# Patient Record
Sex: MALE | Race: WHITE | NOT HISPANIC OR LATINO | Employment: OTHER | URBAN - METROPOLITAN AREA
[De-identification: names, ages, dates, MRNs, and addresses within clinical notes are randomized per-mention and may not be internally consistent; named-entity substitution may affect disease eponyms.]

---

## 2020-07-20 ENCOUNTER — OFFICE VISIT - HEALTHEAST (OUTPATIENT)
Dept: FAMILY MEDICINE | Facility: CLINIC | Age: 70
End: 2020-07-20

## 2020-07-20 DIAGNOSIS — Z71.1 CONCERN ABOUT SKIN CANCER WITHOUT DIAGNOSIS: ICD-10-CM

## 2020-07-20 DIAGNOSIS — F17.200 TOBACCO USE DISORDER: ICD-10-CM

## 2020-07-20 DIAGNOSIS — I10 ESSENTIAL HYPERTENSION: ICD-10-CM

## 2020-07-20 DIAGNOSIS — E88.810 METABOLIC SYNDROME X: ICD-10-CM

## 2020-07-20 DIAGNOSIS — Z00.00 ROUTINE GENERAL MEDICAL EXAMINATION AT A HEALTH CARE FACILITY: ICD-10-CM

## 2020-07-20 DIAGNOSIS — E78.5 DYSLIPIDEMIA: ICD-10-CM

## 2020-07-20 DIAGNOSIS — Z11.59 ENCOUNTER FOR HEPATITIS C SCREENING TEST FOR LOW RISK PATIENT: ICD-10-CM

## 2020-07-20 DIAGNOSIS — Z11.4 SCREENING FOR HIV WITHOUT PRESENCE OF RISK FACTORS: ICD-10-CM

## 2020-07-20 DIAGNOSIS — Z12.5 SCREENING FOR PROSTATE CANCER: ICD-10-CM

## 2020-07-20 DIAGNOSIS — Z12.11 SPECIAL SCREENING FOR MALIGNANT NEOPLASMS, COLON: ICD-10-CM

## 2020-07-20 LAB
ALT SERPL W P-5'-P-CCNC: 13 U/L (ref 0–45)
ANION GAP SERPL CALCULATED.3IONS-SCNC: 11 MMOL/L (ref 5–18)
BUN SERPL-MCNC: 12 MG/DL (ref 8–22)
CALCIUM SERPL-MCNC: 9.7 MG/DL (ref 8.5–10.5)
CHLORIDE BLD-SCNC: 104 MMOL/L (ref 98–107)
CHOLEST SERPL-MCNC: 192 MG/DL
CO2 SERPL-SCNC: 25 MMOL/L (ref 22–31)
CREAT SERPL-MCNC: 0.84 MG/DL (ref 0.7–1.3)
FASTING STATUS PATIENT QL REPORTED: YES
GFR SERPL CREATININE-BSD FRML MDRD: >60 ML/MIN/1.73M2
GLUCOSE BLD-MCNC: 94 MG/DL (ref 70–125)
HBA1C MFR BLD: 5.4 %
HCV AB SERPL QL IA: NEGATIVE
HDLC SERPL-MCNC: 45 MG/DL
HIV 1+2 AB+HIV1 P24 AG SERPL QL IA: NEGATIVE
LDLC SERPL CALC-MCNC: 119 MG/DL
POTASSIUM BLD-SCNC: 4.4 MMOL/L (ref 3.5–5)
PSA SERPL-MCNC: 1.7 NG/ML (ref 0–4.5)
SODIUM SERPL-SCNC: 140 MMOL/L (ref 136–145)
TRIGL SERPL-MCNC: 141 MG/DL

## 2020-07-20 ASSESSMENT — MIFFLIN-ST. JEOR: SCORE: 1266.82

## 2020-07-20 NOTE — ASSESSMENT & PLAN NOTE
The patient previously tolerated and had a robust response to 40 mg of atorvastatin.  We discussed his ten-year prevascular risk whichbased on smoking, blood pressure, age and gender will likely exceed 20%.  I will recommend resuming atorvastatin at either 20 mg or 40 mg after his fasting lab work is reviewed.

## 2020-07-20 NOTE — ASSESSMENT & PLAN NOTE
Borderline hypertensive today.  Previously his blood pressure was elevated.  The patient described an episode of hypotension which resulted in an emergency department visit.  For that reason, he be reticent to start an antihypertensive medication.

## 2020-07-20 NOTE — ASSESSMENT & PLAN NOTE
Based on review of records from his previous health system, this patient meets 4/5 criteria for metabolic syndrome.  This is likely dietary with some contribution of his smoking.  Weight has always been low and is stable.  We will update his fasting lab work today.  We discussed the dietary changes that would be required if he does have metabolic syndrome/prediabetes.

## 2020-07-20 NOTE — ASSESSMENT & PLAN NOTE
This patient is new to clinic.  Annual exam.  He continues to work in a [employer] firm.  His job does involve travel.  Muchof his food is convenience (fast food or restaurant food).  He describes himself as a minimalist with respect to healthcare and is in clinic because his wife and daughter mandated today's visit.  -Preventative health: Colonoscopy order placed.  There is likely a family history colon cancer in his mother.  We discussed shingles vaccination.  Will check for hepatitis C and HIV.  - Patient requests dermatology referral.  He has sun damage but no obvious lesions that are of concern today.  - No obstructive symptoms of the lower urinary tract.  The patient does request PSA testing.  This was ordered.  - Metabolic syndrome as discussed elsewhere

## 2020-07-21 ENCOUNTER — COMMUNICATION - HEALTHEAST (OUTPATIENT)
Dept: FAMILY MEDICINE | Facility: CLINIC | Age: 70
End: 2020-07-21

## 2020-12-12 ENCOUNTER — RECORDS - HEALTHEAST (OUTPATIENT)
Dept: ADMINISTRATIVE | Facility: OTHER | Age: 70
End: 2020-12-12

## 2021-06-04 VITALS
OXYGEN SATURATION: 97 % | DIASTOLIC BLOOD PRESSURE: 88 MMHG | WEIGHT: 120.4 LBS | HEART RATE: 74 BPM | TEMPERATURE: 96.6 F | SYSTOLIC BLOOD PRESSURE: 140 MMHG | HEIGHT: 67 IN | RESPIRATION RATE: 16 BRPM | BODY MASS INDEX: 18.9 KG/M2

## 2021-06-09 NOTE — PROGRESS NOTES
Assessment and Plan:     Problem List Items Addressed This Visit     Dyslipidemia     The patient previously tolerated and had a robust response to 40 mg of atorvastatin.  We discussed his ten-year prevascular risk which based on smoking, blood pressure, age and gender will likely exceed 20%.  I will recommend resuming atorvastatin at either 20 mg or 40 mg after his fasting lab work is reviewed.         Relevant Orders    Lipid [place] FASTING    HTN (hypertension)     Borderline hypertensive today.  Previously his blood pressure was elevated.  The patient described an episode of hypotension which resulted in an emergency department visit.  For that reason, he be reticent to start an antihypertensive medication.         Tobacco use disorder     60+ pack year history.  Discussed in detail lung cancer screening.  Declined.             Routine general medical examination at a health care facility - Primary     This patient is new to clinic.  Annual exam.  He continues to work in a [employer] firm.  His job does involve travel.  Much of his food is convenience (fast food or restaurant food).  He describes himself as a minimalist with respect to healthcare and is in clinic because his wife and daughter mandated today's visit.  -Preventative health: Colonoscopy order placed.  There is likely a family history colon cancer in his mother.  We discussed shingles vaccination.  Will check for hepatitis C and HIV.  - Patient requests dermatology referral.  He has sun damage but no obvious lesions that are of concern today.  - No obstructive symptoms of the lower urinary tract.  The patient does request PSA testing.  This was ordered.  - Metabolic syndrome as discussed elsewhere         Relevant Orders    Lipid [place] FASTING    Basic Metabolic Panel    ALT (SGPT)    Glycosylated Hemoglobin A1c    Hepatitis C Antibody (Anti-HCV)    HIV Antigen/Antibody Screening Cascade    Metabolic syndrome X     Based on review of  records from his previous health system, this patient meets 4/5 criteria for metabolic syndrome.  This is likely dietary with some contribution of his smoking.  Weight has always been low and is stable.  We will update his fasting lab work today.  We discussed the dietary changes that would be required if he does have metabolic syndrome/prediabetes.         Relevant Orders    Lipid [place] FASTING    Basic Metabolic Panel    Glycosylated Hemoglobin A1c      Other Visit Diagnoses     Concern about skin cancer without diagnosis        Relevant Orders    Ambulatory referral to Dermatology    Screening for prostate cancer        Relevant Orders    PSA (Prostatic-Specific Antigen), Annual Screen    Screening for HIV without presence of risk factors        Relevant Orders    HIV Antigen/Antibody Screening Cascade    Encounter for hepatitis C screening test for low risk patient        Relevant Orders    Hepatitis C Antibody (Anti-HCV)    Special screening for malignant neoplasms, colon        Relevant Orders    Ambulatory referral for Colonoscopy        The patient's current medical problems were reviewed.    I have had an Advance Directives discussion with the patient.  The following health maintenance schedule was reviewed with the patient and provided in printed form in the after visit summary:   Health Maintenance   Topic Date Due     HEPATITIS C SCREENING  1950     COLORECTAL CANCER SCREENING  09/16/1970     LIPID  09/16/1987     PNEUMOCOCCAL IMMUNIZATION 65+ LOW/MEDIUM RISK (1 of 2 - PCV13) 08/03/2020 (Originally 9/16/2017)     ZOSTER VACCINES (1 of 2) 08/03/2020 (Originally 9/16/2002)     INFLUENZA VACCINE RULE BASED (1) 08/01/2020     MEDICARE ANNUAL WELLNESS VISIT  07/20/2021     FALL RISK ASSESSMENT  07/20/2021     ADVANCE CARE PLANNING  07/20/2025     TD 18+ HE  07/16/2026     HEPATITIS B VACCINES  Aged Out        Subjective:   Chief Complaint: Jaime Giron is an 69 y.o. male here for an Annual Wellness  "visit.   HPI:      \"I feel fine.\"  He has not been on antihypertensive medications for 3+ years.      Smoking.  Previously 2-3 packs per day.  Now 1-2 packs per day.  Failed chantix in 2013.     Review of Systems:   Please see above.  The rest of the review of systems are negative for all systems.    Patient Care Team:  Cornelius Grimaldo MD as PCP - General (Family Medicine)     Patient Active Problem List   Diagnosis     Dyslipidemia     ED (erectile dysfunction) of organic origin     HTN (hypertension)     Tobacco use disorder     Routine general medical examination at a health care facility     Metabolic syndrome X     Past Medical History:   Diagnosis Date     Hypertension      Prediabetes       Past Surgical History:   Procedure Laterality Date     OTHER SURGICAL HISTORY      broken cheek bone repair       Family History   Adopted: Yes   Problem Relation Age of Onset     Cancer Mother         colon ?     Cancer Half Brother      No Medical Problems Daughter      No Medical Problems Daughter       Social History     Socioeconomic History     Marital status:      Spouse name: Not on file     Number of children: Not on file     Years of education: Not on file     Highest education level: Not on file   Occupational History     Not on file   Social Needs     Financial resource strain: Not on file     Food insecurity     Worry: Not on file     Inability: Not on file     Transportation needs     Medical: Not on file     Non-medical: Not on file   Tobacco Use     Smoking status: Current Every Day Smoker     Packs/day: 1.00     Years: 45.00     Pack years: 45.00     Smokeless tobacco: Never Used     Tobacco comment: 1 pack per day    Substance and Sexual Activity     Alcohol use: Yes     Comment: 4-5 beers per week      Drug use: Not Currently     Sexual activity: Yes     Partners: Female   Lifestyle     Physical activity     Days per week: Not on file     Minutes per session: Not on file     Stress: Not on file " "  Relationships     Social connections     Talks on phone: Not on file     Gets together: Not on file     Attends Muslim service: Not on file     Active member of club or organization: Not on file     Attends meetings of clubs or organizations: Not on file     Relationship status: Not on file     Intimate partner violence     Fear of current or ex partner: Not on file     Emotionally abused: Not on file     Physically abused: Not on file     Forced sexual activity: Not on file   Other Topics Concern     Not on file   Social History Narrative     Not on file      No current outpatient medications on file.     No current facility-administered medications for this visit.       Objective:   Vital Signs:   Visit Vitals  /88 (Patient Site: Left Arm, Patient Position: Sitting, Cuff Size: Adult Regular)   Pulse 74   Temp 96.6  F (35.9  C) (Oral)   Resp 16   Ht 5' 6.5\" (1.689 m)   Wt 120 lb 6.4 oz (54.6 kg)   SpO2 97% Comment: room air   BMI 19.14 kg/m       VisionScreening:  No exam data present     PHYSICAL EXAM  Physical Exam  Vitals signs reviewed.   Constitutional:       General: He is not in acute distress.     Appearance: He is well-developed.   HENT:      Head: Normocephalic and atraumatic.      Right Ear: External ear normal.      Left Ear: External ear normal.      Nose: Nose normal.      Mouth/Throat:      Pharynx: No oropharyngeal exudate.   Eyes:      General: No scleral icterus.        Right eye: No discharge.         Left eye: No discharge.      Conjunctiva/sclera: Conjunctivae normal.      Pupils: Pupils are equal, round, and reactive to light.   Neck:      Musculoskeletal: Normal range of motion.      Thyroid: No thyromegaly.   Cardiovascular:      Rate and Rhythm: Normal rate and regular rhythm.      Heart sounds: Normal heart sounds. No murmur. No friction rub. No gallop.    Pulmonary:      Effort: Pulmonary effort is normal. No respiratory distress.      Breath sounds: Normal breath sounds. No " wheezing.   Abdominal:      General: There is no distension.      Palpations: Abdomen is soft. There is no mass.      Tenderness: There is no abdominal tenderness.   Musculoskeletal: Normal range of motion.   Lymphadenopathy:      Cervical: No cervical adenopathy.   Skin:     General: Skin is warm.   Neurological:      Mental Status: He is alert and oriented to person, place, and time.      Cranial Nerves: No cranial nerve deficit.      Motor: No abnormal muscle tone.      Deep Tendon Reflexes: Reflexes are normal and symmetric.   Psychiatric:         Behavior: Behavior normal.         Thought Content: Thought content normal.         Judgment: Judgment normal.       Assessment Results 7/20/2020   Activities of Daily Living No help needed   Instrumental Activities of Daily Living No help needed   Mini Cog Total Score 4     A Mini-Cog score of 0-2 suggests the possibility of dementia, score of 3-5 suggests no dementia    Identified Health Risks:     The patient was counseled and encouraged to consider modifying their diet and eating habits. He was provided with information on recommended healthy diet options.  Patient's advanced directive was discussed and I am comfortable with the patient's wishes.

## 2021-06-16 PROBLEM — E88.810 METABOLIC SYNDROME X: Status: ACTIVE | Noted: 2020-07-20

## 2021-06-16 PROBLEM — Z00.00 ROUTINE GENERAL MEDICAL EXAMINATION AT A HEALTH CARE FACILITY: Status: ACTIVE | Noted: 2020-07-20

## 2021-06-18 NOTE — PATIENT INSTRUCTIONS - HE
Patient Instructions by Cornelius Grimaldo MD at 7/20/2020  9:00 AM     Author: Cornelius Grimaldo MD Service: -- Author Type: Physician    Filed: 7/20/2020  9:40 AM Encounter Date: 7/20/2020 Status: Addendum    : Cornelius Grimaldo MD (Physician)    Related Notes: Original Note by Cornelius Grimaldo MD (Physician) filed at 7/20/2020  9:09 AM       Consider SHINGLES VACCINATION    Patient Education   Understanding USDA MyPlate  The USDA (US Department of Agriculture) has guidelines to help you make healthy food choices. These are called MyPlate. MyPlate shows the food groups that make up healthy meals using the image of a place setting. Before you eat, think about the healthiest choices for what to put onto your plate or into your cup or bowl. To learn more about building a healthy plate, visit www.choosemyplate.gov.       The Food Groups    Fruits: Any fruit or 100% fruit juice counts as part of the Fruit Group. Fruits may be fresh, canned, frozen, or dried, and may be whole, cut-up, or pureed. Make half your plate fruits and vegetables.    Vegetables: Any vegetable or 100% vegetable juice counts as a member of the Vegetable Group. Vegetables may be fresh, frozen, canned, or dried. They can be served raw or cooked and may be whole, cut-up, or mashed. Make half your plate fruits and vegetables.     Grains: All foods made from grains are part of the Grains Group. These include wheat, rice, oats, cornmeal, and barley such as bread, pasta, oatmeal, cereal, tortillas, and grits. Grains should be no more than a quarter of your plate. At least half of your grains should be whole grains.    Protein: This group includes meat, poultry, seafood, beans and peas, eggs, processed soy products (like tofu), nuts (including nut butters), and seeds. Make protein choices no more than a quarter of your plate. Meat and poultry choices should be lean or low fat.    Dairy: All fluid milk products and foods made from milk that  contain calcium, like yogurt and cheese are part of the Dairy Group. (Foods that have little calcium, such as cream, butter, and cream cheese, are not part of the group.) Most dairy choices should be low-fat or fat-free.    Oils: These are fats that are liquid at room temperature. They include canola, corn, olive, soybean, and sunflower oil. Foods that are mainly oil include mayonnaise, certain salad dressings, and soft margarines. You should have only 5 to 7 teaspoons of oils a day. You probably already get this much from the food you eat.  Use [employer] to Help Build Your Meals  The SuperTracker can help you plan and track your meals and activity. You can look up individual foods to see or compare their nutritional value. You can get guidelines for what and how much you should eat. You can compare your food choices. And you can assess personal physical activities and see ways you can improve. Go to www.[employer] [facility].gov/CURRENTcker/.    8382-6269 [employer]. [address] [place] [address]. All rights reserved. This information is not intended as a substitute for professional medical care. Always follow your healthcare professional's instructions.             Advance Directive  Patients advance directive was discussed and I am comfortable with the patients wishes.  Patient Education   Personalized Prevention Plan  You are due for the preventive services outlined below.  Your care team is available to assist you in scheduling these services.  If you have already completed any of these items, please share that information with your care team to update in your medical record.  Health Maintenance   Topic Date Due   ? HEPATITIS C SCREENING  1950   ? COLORECTAL CANCER SCREENING  09/16/1970   ? LIPID  09/16/1987   ? PNEUMOCOCCAL IMMUNIZATION 65+ LOW/MEDIUM RISK (1 of 2 - PCV13) 08/03/2020 (Originally 9/16/2017)   ? ZOSTER VACCINES (1 of 2) 08/03/2020 (Originally 9/16/2002)   ? INFLUENZA  VACCINE RULE BASED (1) 08/01/2020   ? MEDICARE ANNUAL WELLNESS VISIT  07/20/2021   ? FALL RISK ASSESSMENT  07/20/2021   ? ADVANCE CARE PLANNING  07/20/2025   ? TD 18+ HE  07/16/2026   ? HEPATITIS B VACCINES  Aged Out

## 2021-06-20 NOTE — LETTER
Letter by Cornelius Grimaldo MD at      Author: Cornelius Grimaldo MD Service: -- Author Type: --    Filed:  Encounter Date: 7/21/2020 Status: (Other)         Jaime Giron  [address] [facility] [address]             July 21, 2020         Dear Mr. Giron,    Below are the results from your recent visit:    Resulted Orders   Lipid [place] FASTING   Result Value Ref Range    Cholesterol 192 <=199 mg/dL    Triglycerides 141 <=149 mg/dL    HDL Cholesterol 45 >=40 mg/dL    LDL Calculated 119 <=129 mg/dL    Patient Fasting > 8hrs? Yes    Basic Metabolic Panel   Result Value Ref Range    Sodium 140 136 - 145 mmol/L    Potassium 4.4 3.5 - 5.0 mmol/L    Chloride 104 98 - 107 mmol/L    CO2 25 22 - 31 mmol/L    Anion Gap, Calculation 11 5 - 18 mmol/L    Glucose 94 70 - 125 mg/dL    Calcium 9.7 8.5 - 10.5 mg/dL    BUN 12 8 - 22 mg/dL    Creatinine 0.84 0.70 - 1.30 mg/dL    GFR MDRD Af Amer >60 >60 mL/min/1.73m2    GFR MDRD Non Af Amer >60 >60 mL/min/1.73m2    Narrative    Fasting Glucose reference range is 70-99 mg/dL per  American Diabetes Association (ADA) guidelines.   ALT (SGPT)   Result Value Ref Range    ALT 13 0 - 45 U/L   PSA (Prostatic-Specific Antigen), Annual Screen   Result Value Ref Range    PSA 1.7 0.0 - 4.5 ng/mL    Narrative    Method is Abbott Prostate-Specific Antigen (PSA)  Standard-WHO 1st International (90:10)   Glycosylated Hemoglobin A1c   Result Value Ref Range    Hemoglobin A1c 5.4 <=5.6 %      Comment:      Prediabetes:   HBA1c       5.7 to 6.4%        Diabetes:        HBA1c        >=6.5%   Patients with Hgb F >5%, total bilirubin >10.0 mg/dL, abnormal red cell turnover, severe renal or hepatic disease or malignancy should not have this A1C method used to diagnose or monitor diabetes.       Hepatitis C Antibody (Anti-HCV)   Result Value Ref Range    Hepatitis C Ab Negative Negative   HIV Antigen/Antibody Screening Cascade   Result Value Ref Range    HIV Antigen / Antibody Negative Negative     Narrative    Method is Abbott HIV Ag/Ab for the detection of HIV p24 antigen, HIV-1 antibodies and HIV-2 antibodies.       All of your testing has improved in comparison to the 2016 set of labs (at your previous clinic).  Your cholesterol panel has improved.  You screened negative for diabetes/prediabetes. Your PSA (or prostate-specific antigen) was normal/negative.  This means that it is unlikely that you have prostate cancer.  This should be rechecked in 1-2 years after a conversation during a physical exam.    Your 10-year cardiovascular risk remains high primarily because of your age, gender, smoking as well as your slightly elevated blood pressure.  Some individuals with this risk profile would consider going on a cholesterol medication and this is a recommendation.  Please let the clinic know if you are interested in starting cholesterol medication.  Based on our conversation in clinic, I think you would decline.    Please call with questions or contact us using [employer].    Sincerely,        Electronically signed by Cornelius Grimaldo MD

## 2022-02-02 ENCOUNTER — TRANSFERRED RECORDS (OUTPATIENT)
Dept: MULTI SPECIALTY CLINIC | Facility: CLINIC | Age: 72
End: 2022-02-02

## 2022-11-04 ENCOUNTER — OFFICE VISIT (OUTPATIENT)
Dept: FAMILY MEDICINE | Facility: CLINIC | Age: 72
End: 2022-11-04
Payer: COMMERCIAL

## 2022-11-04 VITALS
DIASTOLIC BLOOD PRESSURE: 80 MMHG | BODY MASS INDEX: 17.43 KG/M2 | HEART RATE: 76 BPM | RESPIRATION RATE: 16 BRPM | TEMPERATURE: 97.8 F | WEIGHT: 111.06 LBS | HEIGHT: 67 IN | SYSTOLIC BLOOD PRESSURE: 122 MMHG

## 2022-11-04 DIAGNOSIS — R39.9 LOWER URINARY TRACT SYMPTOMS (LUTS): ICD-10-CM

## 2022-11-04 DIAGNOSIS — I10 PRIMARY HYPERTENSION: ICD-10-CM

## 2022-11-04 DIAGNOSIS — Z12.5 SCREENING FOR PROSTATE CANCER: ICD-10-CM

## 2022-11-04 DIAGNOSIS — F17.200 TOBACCO USE DISORDER: ICD-10-CM

## 2022-11-04 DIAGNOSIS — Z00.00 ENCOUNTER FOR MEDICARE ANNUAL WELLNESS EXAM: Primary | ICD-10-CM

## 2022-11-04 DIAGNOSIS — E78.5 DYSLIPIDEMIA: ICD-10-CM

## 2022-11-04 DIAGNOSIS — R63.4 WEIGHT LOSS: ICD-10-CM

## 2022-11-04 DIAGNOSIS — E88.810 METABOLIC SYNDROME X: ICD-10-CM

## 2022-11-04 LAB
ALBUMIN SERPL BCG-MCNC: 4.3 G/DL (ref 3.5–5.2)
ALP SERPL-CCNC: 89 U/L (ref 40–129)
ALT SERPL W P-5'-P-CCNC: 11 U/L (ref 10–50)
ANION GAP SERPL CALCULATED.3IONS-SCNC: 14 MMOL/L (ref 7–15)
AST SERPL W P-5'-P-CCNC: 21 U/L (ref 10–50)
BILIRUB SERPL-MCNC: 0.5 MG/DL
BUN SERPL-MCNC: 13.6 MG/DL (ref 8–23)
CALCIUM SERPL-MCNC: 9.7 MG/DL (ref 8.8–10.2)
CHLORIDE SERPL-SCNC: 101 MMOL/L (ref 98–107)
CHOLEST SERPL-MCNC: 196 MG/DL
CREAT SERPL-MCNC: 0.92 MG/DL (ref 0.67–1.17)
DEPRECATED HCO3 PLAS-SCNC: 23 MMOL/L (ref 22–29)
ERYTHROCYTE [DISTWIDTH] IN BLOOD BY AUTOMATED COUNT: 14.4 % (ref 10–15)
GFR SERPL CREATININE-BSD FRML MDRD: 89 ML/MIN/1.73M2
GLUCOSE SERPL-MCNC: 94 MG/DL (ref 70–99)
HCT VFR BLD AUTO: 49.7 % (ref 40–53)
HDLC SERPL-MCNC: 55 MG/DL
HGB BLD-MCNC: 16.5 G/DL (ref 13.3–17.7)
LDLC SERPL CALC-MCNC: 115 MG/DL
MCH RBC QN AUTO: 30.9 PG (ref 26.5–33)
MCHC RBC AUTO-ENTMCNC: 33.2 G/DL (ref 31.5–36.5)
MCV RBC AUTO: 93 FL (ref 78–100)
NONHDLC SERPL-MCNC: 141 MG/DL
PLATELET # BLD AUTO: 257 10E3/UL (ref 150–450)
POTASSIUM SERPL-SCNC: 4.1 MMOL/L (ref 3.4–5.3)
PROT SERPL-MCNC: 7.1 G/DL (ref 6.4–8.3)
PSA SERPL-MCNC: 1.7 NG/ML (ref 0–6.5)
RBC # BLD AUTO: 5.34 10E6/UL (ref 4.4–5.9)
SODIUM SERPL-SCNC: 138 MMOL/L (ref 136–145)
TRIGL SERPL-MCNC: 128 MG/DL
WBC # BLD AUTO: 6.4 10E3/UL (ref 4–11)

## 2022-11-04 PROCEDURE — G0103 PSA SCREENING: HCPCS | Performed by: FAMILY MEDICINE

## 2022-11-04 PROCEDURE — 80061 LIPID PANEL: CPT | Performed by: FAMILY MEDICINE

## 2022-11-04 PROCEDURE — G0439 PPPS, SUBSEQ VISIT: HCPCS | Performed by: FAMILY MEDICINE

## 2022-11-04 PROCEDURE — 85027 COMPLETE CBC AUTOMATED: CPT | Performed by: FAMILY MEDICINE

## 2022-11-04 PROCEDURE — 80053 COMPREHEN METABOLIC PANEL: CPT | Performed by: FAMILY MEDICINE

## 2022-11-04 PROCEDURE — 99213 OFFICE O/P EST LOW 20 MIN: CPT | Mod: 25 | Performed by: FAMILY MEDICINE

## 2022-11-04 PROCEDURE — 90471 IMMUNIZATION ADMIN: CPT | Performed by: FAMILY MEDICINE

## 2022-11-04 PROCEDURE — 0124A COVID-19,PF,PFIZER BOOSTER BIVALENT: CPT | Performed by: FAMILY MEDICINE

## 2022-11-04 PROCEDURE — 36415 COLL VENOUS BLD VENIPUNCTURE: CPT | Performed by: FAMILY MEDICINE

## 2022-11-04 PROCEDURE — 90662 IIV NO PRSV INCREASED AG IM: CPT | Performed by: FAMILY MEDICINE

## 2022-11-04 PROCEDURE — 91312 COVID-19,PF,PFIZER BOOSTER BIVALENT: CPT | Performed by: FAMILY MEDICINE

## 2022-11-04 ASSESSMENT — ENCOUNTER SYMPTOMS
NERVOUS/ANXIOUS: 0
HEARTBURN: 0
JOINT SWELLING: 0
ABDOMINAL PAIN: 0
PALPITATIONS: 0
PARESTHESIAS: 0
ARTHRALGIAS: 0
CHILLS: 0
SORE THROAT: 0
HEMATURIA: 0
DIARRHEA: 0
DIZZINESS: 0
HEADACHES: 0
HEMATOCHEZIA: 0
EYE PAIN: 0
CONSTIPATION: 0
FEVER: 0
FREQUENCY: 1
WEAKNESS: 0
COUGH: 1
DYSURIA: 0
FREQUENCY: 0
COUGH: 0
MYALGIAS: 0
NAUSEA: 0
SHORTNESS OF BREATH: 0

## 2022-11-04 ASSESSMENT — ACTIVITIES OF DAILY LIVING (ADL): CURRENT_FUNCTION: NO ASSISTANCE NEEDED

## 2022-11-04 NOTE — ASSESSMENT & PLAN NOTE
Annual exam.  Now fully retired.  Patient has lost weight since I last saw him.  I suspect that this is multifactorial.  He remains energetic does not describe any significant pain or discomfort.  He had COVID-19 and his taste changed.  Foods that contain protein no longer taste appetizing.  He has shifted his nutrition to be more sugar and starch based.  I suspect that this is the explanation for weight loss.  We will check comprehensive metabolic panel, CBC.  - She is a candidate for lung cancer screening.  Declines.  - He has had colon cancer screening.  - He opts for prostate cancer screening.  LUTS.  - He received the COVID-19 vaccination and influenza vaccination today.  He is interested in shingles vaccination and pneumococcal vaccination but will check with his insurance and likely get this at a pharmacy closer to his home.  - Follow-up in 1 year.

## 2022-11-04 NOTE — ASSESSMENT & PLAN NOTE
I have asked the patient to focus on protein supplementation given that has struggled to eat animal-based protein.

## 2022-11-04 NOTE — PROGRESS NOTES
"SUBJECTIVE:   Jaime is a 72 year old who presents for Preventive Visit.    Chief Complaint   Patient presents with     Wellness Visit     Pt. Fasting.     Weight Loss     Patient has been advised of split billing requirements and indicates understanding: Yes  Are you in the first 12 months of your Medicare coverage?  No    LUTS:   - decreased urinary flow.      Weight loss:   - retired.  Decreased hours   - he has been eating less over the summer months.  Wife would kick.   - energy hsa been okay   - colonscopy in 2020.   - splits time between [place] and [place]   - nutrition: \"wife is a good cook when she is home.\"  She is gone 3-4 days per week.  He often cooks for himself.      Healthy Habits:     In general, how would you rate your overall health?  Good    Frequency of exercise:  2-3 days/week    Duration of exercise:  15-30 minutes    Do you usually eat at least 4 servings of fruit and vegetables a day, include whole grains    & fiber and avoid regularly eating high fat or \"junk\" foods?  No    Taking medications regularly:  Yes    Medication side effects:  None    Ability to successfully perform activities of daily living:  No assistance needed    Home Safety:  No safety concerns identified    Hearing Impairment:  No hearing concerns    In the past 6 months, have you been bothered by leaking of urine?  No    In general, how would you rate your overall mental or emotional health?  Excellent      PHQ-2 Total Score: 0    Additional concerns today:  No    Do you feel safe in your environment? Yes    Have you ever done Advance Care Planning? (For example, a Health Directive, POLST, or a discussion with a medical provider or your loved ones about your wishes): Yes, patient states has an Advance Care Planning document and will bring a copy to the clinic.       Fall risk  Fallen 2 or more times in the past year?: No  Any fall with injury in the past year?: No    Cognitive Screening   1) Repeat 3 items (Leader, Season, " Table)  PASS  2) Clock draw: NORMAL  3) 3 item recall: Recalls 2 objects   Results: NORMAL clock, 1-2 items recalled: COGNITIVE IMPAIRMENT LESS LIKELY    Mini-CogTM Copyright TRAVIS Rueda. Licensed by the author for use in [facility]; reprinted with permission (pamela@[email or web address]). All rights reserved.      Do you have sleep apnea, excessive snoring or daytime drowsiness?: no    Reviewed and updated as needed this visit by clinical staff   Tobacco  Allergies  Meds  Problems             Reviewed and updated as needed this visit by Provider      Problems            Social History     Tobacco Use     Smoking status: Every Day     Packs/day: 1.00     Years: 45.00     Pack years: 45.00     Types: Cigarettes     Smokeless tobacco: Never     Tobacco comments:     1 pack per day   Substance Use Topics     Alcohol use: Yes     Comment: Alcoholic Drinks/day: 4-5 or more beers per week         Alcohol Use 11/4/2022   Prescreen: >3 drinks/day or >7 drinks/week? Yes   AUDIT SCORE  3               Current providers sharing in care for this patient include:   Patient Care Team:  Cornelius Grimaldo MD as PCP - General (Family Practice)  Cornelius Grimaldo MD as Assigned PCP    The following health maintenance items are reviewed in Epic and correct as of today:  Health Maintenance   Topic Date Due     HEPATITIS B IMMUNIZATION (1 of 3 - 3-dose series) Never done     Pneumococcal Vaccine: 65+ Years (1 - PCV) Never done     ZOSTER IMMUNIZATION (1 of 2) Never done     LUNG CANCER SCREENING  Never done     AORTIC ANEURYSM SCREENING (SYSTEM ASSIGNED)  Never done     COVID-19 Vaccine (4 - Booster for Pfizer series) 11/28/2021     INFLUENZA VACCINE (1) 09/01/2022     NICOTINE/TOBACCO CESSATION COUNSELING Q 1 YR  11/04/2023     MEDICARE ANNUAL WELLNESS VISIT  11/04/2023     ANNUAL REVIEW OF HM ORDERS  11/04/2023     FALL RISK ASSESSMENT  11/04/2023     COLORECTAL CANCER SCREENING  12/16/2023     LIPID  07/20/2025     DTAP/TDAP/TD  "IMMUNIZATION (3 - Td or Tdap) 07/16/2026     ADVANCE CARE PLANNING  11/04/2027     HEPATITIS C SCREENING  Completed     PHQ-2 (once per calendar year)  Completed     IPV IMMUNIZATION  Aged Out     MENINGITIS IMMUNIZATION  Aged Out     Review of Systems   Constitutional: Negative for chills and fever.   HENT: Negative for congestion, ear pain, hearing loss and sore throat.    Eyes: Negative for pain and visual disturbance.   Respiratory: Negative for cough and shortness of breath.    Cardiovascular: Negative for chest pain, palpitations and peripheral edema.   Gastrointestinal: Negative for abdominal pain, constipation, diarrhea, heartburn, hematochezia and nausea.   Genitourinary: Negative for dysuria, frequency, genital sores, hematuria, impotence, penile discharge and urgency.   Musculoskeletal: Negative for arthralgias, joint swelling and myalgias.   Skin: Negative for rash.   Neurological: Negative for dizziness, weakness, headaches and paresthesias.   Psychiatric/Behavioral: Negative for mood changes. The patient is not nervous/anxious.        OBJECTIVE:   /80 (BP Location: Left arm, Patient Position: Sitting, Cuff Size: Adult Regular)   Pulse 76   Temp 97.8  F (36.6  C) (Oral)   Resp 16   Ht 1.689 m (5' 6.5\")   Wt 50.4 kg (111 lb 1 oz)   BMI 17.66 kg/m   Estimated body mass index is 17.66 kg/m  as calculated from the following:    Height as of this encounter: 1.689 m (5' 6.5\").    Weight as of this encounter: 50.4 kg (111 lb 1 oz).  Physical Exam  Vitals reviewed.   Constitutional:       General: He is not in acute distress.     Appearance: Normal appearance. He is not ill-appearing.   HENT:      Head: Normocephalic and atraumatic.      Right Ear: External ear normal.      Left Ear: External ear normal.      Nose: Nose normal.      Mouth/Throat:      Pharynx: Oropharynx is clear. No oropharyngeal exudate or posterior oropharyngeal erythema.   Eyes:      General: No scleral icterus.        Right " eye: No discharge.         Left eye: No discharge.      Extraocular Movements: Extraocular movements intact.      Conjunctiva/sclera: Conjunctivae normal.      Pupils: Pupils are equal, round, and reactive to light.   Neck:      Comments: No thyromegaly.  Cardiovascular:      Rate and Rhythm: Normal rate and regular rhythm.      Heart sounds: Normal heart sounds. No murmur heard.    No friction rub. No gallop.   Pulmonary:      Effort: Pulmonary effort is normal. No respiratory distress.      Breath sounds: Normal breath sounds. No wheezing or rales.   Abdominal:      General: There is no distension.      Palpations: Abdomen is soft. There is no mass.      Tenderness: There is no abdominal tenderness.   Musculoskeletal:         General: No signs of injury. Normal range of motion.      Cervical back: Normal range of motion.      Right lower leg: No edema.      Left lower leg: No edema.   Lymphadenopathy:      Cervical: No cervical adenopathy.   Skin:     General: Skin is warm.      Coloration: Skin is not jaundiced.      Findings: No rash.   Neurological:      General: No focal deficit present.      Mental Status: He is alert and oriented to person, place, and time.      Cranial Nerves: No cranial nerve deficit.      Deep Tendon Reflexes: Reflexes normal.   Psychiatric:         Mood and Affect: Mood normal.         Diagnostic Test Results:  Labs reviewed in Epic    ASSESSMENT / PLAN:     Problem List Items Addressed This Visit     Dyslipidemia    Relevant Orders    Lipid Profile (Chol, Trig, HDL, LDL calc)    Encounter for Medicare annual wellness exam - Primary     Annual exam.  Now fully retired.  Patient has lost weight since I last saw him.  I suspect that this is multifactorial.  He remains energetic does not describe any significant pain or discomfort.  He had COVID-19 and his taste changed.  Foods that contain protein no longer taste appetizing.  He has shifted his nutrition to be more sugar and starch based.   "I suspect that this is the explanation for weight loss.  We will check comprehensive metabolic panel, CBC.  - She is a candidate for lung cancer screening.  Declines.  - He has had colon cancer screening.  - He opts for prostate cancer screening.  LUTS.  - He received the COVID-19 vaccination and influenza vaccination today.  He is interested in shingles vaccination and pneumococcal vaccination but will check with his insurance and likely get this at a pharmacy closer to his home.  - Follow-up in 1 year.         HTN (hypertension)     BP improved today.  No medications.         Lower urinary tract symptoms (LUTS)     Worsening of obstructive symptoms over the last 6 months.  Previously, we have screened for prostate cancer using PSA.  We will check PSA today.  Consider referral to urology.         Relevant Orders    PSA, screen    Metabolic syndrome X    Relevant Orders    Lipid Profile (Chol, Trig, HDL, LDL calc)    Tobacco use disorder     Discussed screening.  Declines.             Relevant Orders    Lipid Profile (Chol, Trig, HDL, LDL calc)    Weight loss     I have asked the patient to focus on protein supplementation given that has struggled to eat animal-based protein.         Relevant Orders    Comprehensive metabolic panel (BMP + Alb, Alk Phos, ALT, AST, Total. Bili, TP)    CBC with platelets   Other Visit Diagnoses     Screening for prostate cancer        Relevant Orders    PSA, screen          Patient has been advised of split billing requirements and indicates understanding: Yes      COUNSELING:  Reviewed preventive health counseling, as reflected in patient instructions       Regular exercise       Healthy diet/nutrition    Estimated body mass index is 17.66 kg/m  as calculated from the following:    Height as of this encounter: 1.689 m (5' 6.5\").    Weight as of this encounter: 50.4 kg (111 lb 1 oz).    Weight management plan noted, stable and monitoring    He reports that he has been smoking " cigarettes. He has a 45.00 pack-year smoking history. He has never used smokeless tobacco.  Nicotine/Tobacco Cessation Plan:   Information offered: Patient not interested at this time      Appropriate preventive services were discussed with this patient, including applicable screening as appropriate for cardiovascular disease, diabetes, osteopenia/osteoporosis, and glaucoma.  As appropriate for age/gender, discussed screening for colorectal cancer, prostate cancer, breast cancer, and cervical cancer. Checklist reviewing preventive services available has been given to the patient.    Reviewed patients plan of care and provided an AVS. The Basic Care Plan (routine screening as documented in Health Maintenance) for Jaime meets the Care Plan requirement. This Care Plan has been established and reviewed with the Patient.    Counseling Resources:  ATP IV Guidelines  Pooled Cohorts Equation Calculator  Breast Cancer Risk Calculator  Breast Cancer: Medication to Reduce Risk  FRAX Risk Assessment  ICSI Preventive Guidelines  Dietary Guidelines for Americans, 2010  USDA's MyPlate  ASA Prophylaxis  Lung CA Screening    Cornelius Grimaldo MD  [facility]    Identified Health Risks:

## 2022-11-04 NOTE — PROGRESS NOTES
The patient reports that he drinks more than one alcoholic drink per day but denies binge or excessive drinking. He was counseled and given information about possible harmful effects of excessive alcohol intake.

## 2022-11-04 NOTE — PATIENT INSTRUCTIONS
You should get the pneumonia vaccination.      Check with insurance regarding [employer].    I have ordered these vaccinations but it may be more convenient to get them at a pharmacy in your community.    Patient Education   Personalized Prevention Plan  You are due for the preventive services outlined below.  Your care team is available to assist you in scheduling these services.  If you have already completed any of these items, please share that information with your care team to update in your medical record.  Health Maintenance Due   Topic Date Due    Talk to your care team about options to quit tobacco use.  Never done    ANNUAL REVIEW OF HM ORDERS  Never done    Hepatitis B Vaccine (1 of 3 - 3-dose series) Never done    Pneumococcal Vaccine (1 - PCV) Never done    Zoster (Shingles) Vaccine (1 of 2) Never done    LUNG CANCER SCREENING  Never done    AORTIC ANEURYSM SCREENING (SYSTEM ASSIGNED)  Never done    COVID-19 Vaccine (4 - Booster for Pfizer series) 11/28/2021    Flu Vaccine (1) 09/01/2022      Patient Education   Alcohol Use     Many people can enjoy a glass of wine or beer without any negative consequences to their health. According to the Centers for Disease Control and Prevention (CDC), having one or fewer drinks per day for women and two or fewer per day for men is considered moderate drinking.     When people drink more than moderately, it can become concerning. Excessive drinking is defined as consuming 15 drinks or more per week for men and 8 drinks or more per week for women. There are various health problems associated with excessive drinking, which include:  Damage to vital organs like the heart, brain, liver and pancreas  Harm to the digestive tract  Weaken the immune system  Higher risk for heart disease and cancer    There are many resources available to people who are addicted to alcohol. A counselor or other health care provider can give you support. So can a , , or rabbi who  is trained in substance abuse counseling. Friends and family may also help once you are connected with professionals.

## 2022-11-04 NOTE — ASSESSMENT & PLAN NOTE
Worsening of obstructive symptoms over the last 6 months.  Previously, we have screened for prostate cancer using PSA.  We will check PSA today.  Consider referral to urology.

## 2023-02-10 ENCOUNTER — TRANSFERRED RECORDS (OUTPATIENT)
Dept: HEALTH INFORMATION MANAGEMENT | Facility: CLINIC | Age: 73
End: 2023-02-10

## 2023-05-26 ENCOUNTER — TRANSFERRED RECORDS (OUTPATIENT)
Dept: HEALTH INFORMATION MANAGEMENT | Facility: CLINIC | Age: 73
End: 2023-05-26
Payer: COMMERCIAL

## 2023-08-28 ENCOUNTER — TELEPHONE (OUTPATIENT)
Dept: FAMILY MEDICINE | Facility: OTHER | Age: 73
End: 2023-08-28

## 2023-08-28 NOTE — TELEPHONE ENCOUNTER
Spoke with patient. Patient would like to see Dr. Grimaldo. Scheduled 9/18/2023 with Dr. Grimaldo.

## 2023-08-28 NOTE — TELEPHONE ENCOUNTER
Please call patient and let him know that unfortunately I would like him to be seen face to face given the symptoms he has would require labs and a physical assessment. Please schedule face to face and if he wants he can also speak to a nurse if acute symptoms.       YINKA Floyd CNP  Questions or concerns please feel free to send me a [employer] message or call me  Phone : [phone]

## 2023-09-18 ENCOUNTER — OFFICE VISIT (OUTPATIENT)
Dept: FAMILY MEDICINE | Facility: CLINIC | Age: 73
End: 2023-09-18
Payer: COMMERCIAL

## 2023-09-18 ENCOUNTER — ANCILLARY PROCEDURE (OUTPATIENT)
Dept: GENERAL RADIOLOGY | Facility: CLINIC | Age: 73
End: 2023-09-18
Attending: FAMILY MEDICINE
Payer: COMMERCIAL

## 2023-09-18 VITALS
WEIGHT: 105.4 LBS | BODY MASS INDEX: 16.54 KG/M2 | DIASTOLIC BLOOD PRESSURE: 83 MMHG | OXYGEN SATURATION: 97 % | HEIGHT: 67 IN | HEART RATE: 80 BPM | RESPIRATION RATE: 16 BRPM | SYSTOLIC BLOOD PRESSURE: 131 MMHG

## 2023-09-18 DIAGNOSIS — F17.200 TOBACCO USE DISORDER: ICD-10-CM

## 2023-09-18 DIAGNOSIS — R63.4 WEIGHT LOSS: ICD-10-CM

## 2023-09-18 DIAGNOSIS — R63.4 WEIGHT LOSS: Primary | ICD-10-CM

## 2023-09-18 DIAGNOSIS — Z12.5 SCREENING FOR PROSTATE CANCER: ICD-10-CM

## 2023-09-18 LAB
ALBUMIN SERPL BCG-MCNC: 4.1 G/DL (ref 3.5–5.2)
ALP SERPL-CCNC: 90 U/L (ref 40–129)
ALT SERPL W P-5'-P-CCNC: 7 U/L (ref 0–70)
ANION GAP SERPL CALCULATED.3IONS-SCNC: 10 MMOL/L (ref 7–15)
AST SERPL W P-5'-P-CCNC: 21 U/L (ref 0–45)
BILIRUB SERPL-MCNC: 0.5 MG/DL
BUN SERPL-MCNC: 9.1 MG/DL (ref 8–23)
CALCIUM SERPL-MCNC: 9.8 MG/DL (ref 8.8–10.2)
CHLORIDE SERPL-SCNC: 102 MMOL/L (ref 98–107)
CREAT SERPL-MCNC: 0.79 MG/DL (ref 0.67–1.17)
CRP SERPL-MCNC: 4.36 MG/L
DEPRECATED HCO3 PLAS-SCNC: 28 MMOL/L (ref 22–29)
EGFRCR SERPLBLD CKD-EPI 2021: >90 ML/MIN/1.73M2
ERYTHROCYTE [DISTWIDTH] IN BLOOD BY AUTOMATED COUNT: 14.7 % (ref 10–15)
ERYTHROCYTE [SEDIMENTATION RATE] IN BLOOD BY WESTERGREN METHOD: 20 MM/HR (ref 0–20)
GLUCOSE SERPL-MCNC: 110 MG/DL (ref 70–99)
HCT VFR BLD AUTO: 43.6 % (ref 40–53)
HGB BLD-MCNC: 14.7 G/DL (ref 13.3–17.7)
MCH RBC QN AUTO: 31.2 PG (ref 26.5–33)
MCHC RBC AUTO-ENTMCNC: 33.7 G/DL (ref 31.5–36.5)
MCV RBC AUTO: 93 FL (ref 78–100)
PLATELET # BLD AUTO: 268 10E3/UL (ref 150–450)
POTASSIUM SERPL-SCNC: 3.9 MMOL/L (ref 3.4–5.3)
PROT SERPL-MCNC: 7.3 G/DL (ref 6.4–8.3)
PSA SERPL DL<=0.01 NG/ML-MCNC: 2.1 NG/ML (ref 0–6.5)
RBC # BLD AUTO: 4.71 10E6/UL (ref 4.4–5.9)
SODIUM SERPL-SCNC: 140 MMOL/L (ref 136–145)
WBC # BLD AUTO: 6.2 10E3/UL (ref 4–11)

## 2023-09-18 PROCEDURE — 80053 COMPREHEN METABOLIC PANEL: CPT | Performed by: FAMILY MEDICINE

## 2023-09-18 PROCEDURE — 71046 X-RAY EXAM CHEST 2 VIEWS: CPT | Mod: TC | Performed by: RADIOLOGY

## 2023-09-18 PROCEDURE — 99214 OFFICE O/P EST MOD 30 MIN: CPT | Mod: 25 | Performed by: FAMILY MEDICINE

## 2023-09-18 PROCEDURE — 86140 C-REACTIVE PROTEIN: CPT | Performed by: FAMILY MEDICINE

## 2023-09-18 PROCEDURE — 85027 COMPLETE CBC AUTOMATED: CPT | Performed by: FAMILY MEDICINE

## 2023-09-18 PROCEDURE — 90662 IIV NO PRSV INCREASED AG IM: CPT | Performed by: FAMILY MEDICINE

## 2023-09-18 PROCEDURE — 36415 COLL VENOUS BLD VENIPUNCTURE: CPT | Performed by: FAMILY MEDICINE

## 2023-09-18 PROCEDURE — 90471 IMMUNIZATION ADMIN: CPT | Performed by: FAMILY MEDICINE

## 2023-09-18 PROCEDURE — 85652 RBC SED RATE AUTOMATED: CPT | Performed by: FAMILY MEDICINE

## 2023-09-18 PROCEDURE — G0103 PSA SCREENING: HCPCS | Performed by: FAMILY MEDICINE

## 2023-09-18 ASSESSMENT — ENCOUNTER SYMPTOMS: CONSTITUTIONAL NEGATIVE: 1

## 2023-09-18 NOTE — ASSESSMENT & PLAN NOTE
This individual has continued to lose weight.  Family members are concerned that he appears gaunt at this time.  He states that he feels well.  He attributes his weight loss to change of nutrition as he is retired and after a change of taste following a COVID-19 infection several years ago.  He no longer eats beef.  He tends a chicken and fish.  Given that his BMI is lower than 17 kg per metered squared at this time I do think he does need additional work-up.  Laboratory testing will be completed today (broad).  No anemia.  No elevation of white blood cell count today.  He has not had lung cancer screening with CT scan.  His chest x-ray today is normal.  Assuming his laboratory testing comes back within normal limits I would recommend a weight check in 4 to 6 weeks.  If he continues to lose weight I would insist upon him undergoing CT scan of at least his lungs if not his lungs, abdomen and pelvis.

## 2023-09-18 NOTE — PROGRESS NOTES
"  Assessment & Plan   Problem List Items Addressed This Visit       Tobacco use disorder    Relevant Orders    XR Chest 2 Views (Completed)    Weight loss - Primary     This individual has continued to lose weight.  Family members are concerned that he appears gaunt at this time.  He states that he feels well.  He attributes his weight loss to change of nutrition as he is retired and after a change of taste following a COVID-19 infection several years ago.  He no longer eats beef.  He tends a chicken and fish.  Given that his BMI is lower than 17 kg per metered squared at this time I do think he does need additional work-up.  Laboratory testing will be completed today (broad).  No anemia.  No elevation of white blood cell count today.  He has not had lung cancer screening with CT scan.  His chest x-ray today is normal.  Assuming his laboratory testing comes back within normal limits I would recommend a weight check in 4 to 6 weeks.  If he continues to lose weight I would insist upon him undergoing CT scan of at least his lungs if not his lungs, abdomen and pelvis.         Relevant Orders    Comprehensive metabolic panel (BMP + Alb, Alk Phos, ALT, AST, Total. Bili, TP)    CBC with platelets (Completed)    ESR: Erythrocyte sedimentation rate (Completed)    CRP, inflammation    XR Chest 2 Views (Completed)     Other Visit Diagnoses       Screening for prostate cancer        Relevant Orders    PSA, screen           Cornelius Grimaldo MD  [facility] is a 73 year old, presenting for the following health issues:  Weight Problem (Discuss unexplained weight loss concerns)        9/18/2023    10:17 AM   Additional Questions   Roomed by xl       Weight loss:   - energy fine.  Boating everyday.  Plays golf.  Recent travel.     - daughters and wife concerned.     - ongoing back pain.     - nutrition: \"I eat everything.\"  His nutrition changed when he had COVID.  Previously ate beef. " "Now eating chicken and fish.     - retired. No fast food.   - no changes of BMs. No changes in urination.   - no fevers.  No chills.  No sweats.     - recent MRI on low back.  Right foot radicular symptoms with planned surgery.   - he does not think that he could eat more.  \"I have never had a huge appetite.\"     History of Present Illness       Reason for visit:  Weight loss  Symptom onset:  More than a month  Symptom intensity:  Mild  Symptom progression:  Staying the same    He eats 0-1 servings of fruits and vegetables daily.He consumes 3 sweetened beverage(s) daily.He exercises with enough effort to increase his heart rate 30 to 60 minutes per day.  He exercises with enough effort to increase his heart rate 7 days per week.   He is taking medications regularly.     Review of Systems   Constitutional: Negative.    All other systems reviewed and are negative.           Objective    /83 (BP Location: Left arm, Patient Position: Sitting, Cuff Size: Adult Regular)   Pulse 80   Resp 16   Ht 1.689 m (5' 6.5\")   Wt 47.8 kg (105 lb 6.4 oz)   SpO2 97%   BMI 16.76 kg/m    Body mass index is 16.76 kg/m .  Physical Exam  Nursing note reviewed.   Constitutional:       General: He is not in acute distress.     Appearance: Normal appearance. He is not ill-appearing.   HENT:      Head: Normocephalic and atraumatic.   Eyes:      Extraocular Movements: Extraocular movements intact.      Conjunctiva/sclera: Conjunctivae normal.   Pulmonary:      Effort: Pulmonary effort is normal.   Neurological:      Mental Status: He is alert and oriented to person, place, and time.   Psychiatric:         Attention and Perception: Attention normal.         Mood and Affect: Mood normal.         Speech: Speech normal.         Thought Content: Thought content normal.       CHEST RADIOGRAPH: my interpretation: Hyperinflated.  No obvious masses.    [facility] notes reviewed.  MRI findings described. Disc finding. No cancer findings reported. "

## 2023-10-09 ENCOUNTER — TRANSFERRED RECORDS (OUTPATIENT)
Dept: HEALTH INFORMATION MANAGEMENT | Facility: CLINIC | Age: 73
End: 2023-10-09
Payer: COMMERCIAL

## 2023-11-22 ENCOUNTER — OFFICE VISIT (OUTPATIENT)
Dept: FAMILY MEDICINE | Facility: CLINIC | Age: 73
End: 2023-11-22
Payer: COMMERCIAL

## 2023-11-22 VITALS
DIASTOLIC BLOOD PRESSURE: 84 MMHG | WEIGHT: 105.5 LBS | OXYGEN SATURATION: 98 % | BODY MASS INDEX: 16.56 KG/M2 | RESPIRATION RATE: 12 BRPM | HEART RATE: 75 BPM | HEIGHT: 67 IN | TEMPERATURE: 98.4 F | SYSTOLIC BLOOD PRESSURE: 134 MMHG

## 2023-11-22 DIAGNOSIS — Z01.818 PREOP GENERAL PHYSICAL EXAM: Primary | ICD-10-CM

## 2023-11-22 DIAGNOSIS — M54.16 LUMBAR BACK PAIN WITH RADICULOPATHY AFFECTING LOWER EXTREMITY: ICD-10-CM

## 2023-11-22 LAB
ERYTHROCYTE [DISTWIDTH] IN BLOOD BY AUTOMATED COUNT: 14.3 % (ref 10–15)
HCT VFR BLD AUTO: 46 % (ref 40–53)
HGB BLD-MCNC: 15.4 G/DL (ref 13.3–17.7)
HOLD SPECIMEN: NORMAL
INR PPP: 1.01 (ref 0.85–1.15)
MCH RBC QN AUTO: 30.7 PG (ref 26.5–33)
MCHC RBC AUTO-ENTMCNC: 33.5 G/DL (ref 31.5–36.5)
MCV RBC AUTO: 92 FL (ref 78–100)
PLATELET # BLD AUTO: 319 10E3/UL (ref 150–450)
RBC # BLD AUTO: 5.01 10E6/UL (ref 4.4–5.9)
WBC # BLD AUTO: 6.3 10E3/UL (ref 4–11)

## 2023-11-22 PROCEDURE — 93010 ELECTROCARDIOGRAM REPORT: CPT | Performed by: INTERNAL MEDICINE

## 2023-11-22 PROCEDURE — 99214 OFFICE O/P EST MOD 30 MIN: CPT | Performed by: FAMILY MEDICINE

## 2023-11-22 PROCEDURE — 85610 PROTHROMBIN TIME: CPT | Performed by: FAMILY MEDICINE

## 2023-11-22 PROCEDURE — 85027 COMPLETE CBC AUTOMATED: CPT | Performed by: FAMILY MEDICINE

## 2023-11-22 PROCEDURE — 93005 ELECTROCARDIOGRAM TRACING: CPT | Performed by: FAMILY MEDICINE

## 2023-11-22 PROCEDURE — 36415 COLL VENOUS BLD VENIPUNCTURE: CPT | Performed by: FAMILY MEDICINE

## 2023-11-22 PROCEDURE — 80048 BASIC METABOLIC PNL TOTAL CA: CPT | Performed by: FAMILY MEDICINE

## 2023-11-22 RX ORDER — ONDANSETRON 4 MG/1
TABLET, ORALLY DISINTEGRATING ORAL
COMMUNITY
Start: 2023-11-16 | End: 2023-11-22

## 2023-11-22 RX ORDER — OXYCODONE HYDROCHLORIDE 5 MG/1
TABLET ORAL
COMMUNITY
Start: 2023-11-16 | End: 2023-11-22

## 2023-11-22 RX ORDER — CEPHALEXIN 500 MG/1
CAPSULE ORAL
COMMUNITY
Start: 2023-11-16 | End: 2023-11-22

## 2023-11-22 RX ORDER — MUPIROCIN 20 MG/G
OINTMENT TOPICAL
COMMUNITY
Start: 2023-11-16 | End: 2023-11-22

## 2023-11-22 RX ORDER — PREDNISONE 10 MG/1
TABLET ORAL
COMMUNITY
Start: 2023-10-24 | End: 2023-11-22

## 2023-11-22 RX ORDER — HYDROXYZINE HYDROCHLORIDE 10 MG/1
TABLET, FILM COATED ORAL
COMMUNITY
Start: 2023-11-16 | End: 2023-11-22

## 2023-11-22 NOTE — PROGRESS NOTES
[facility]  [address]  [facility] [address]  Phone: [phone]  Fax: [phone]  Primary Provider: Cornelius Grimaldo  Pre-op Performing Provider: ALICIA PEREZ      PREOPERATIVE EVALUATION:  Today's date: 11/22/2023    Jaime is a 73 year old, presenting for the following:  Pre-Op Exam (11/29/23; [facility]; Fax: [phone]; Dr. Bar Liang; L4-5 Transforaminal Interbody Fusion, Posterior Instrumented Fusion and Decompression)        11/22/2023     3:01 PM   Additional Questions   Roomed by AGUEDA Ashley   Accompanied by Self         11/22/2023     3:01 PM   Patient Reported Additional Medications   Patient reports taking the following new medications N/A       Surgical Information:  Surgery/Procedure: L4-5 Transforaminal Interbody Fusion, Posterior Instrumented Fusion and Decompression  Surgery Location: [facility]  Surgeon: Dr. Bar Liang  Surgery Date: 11/29/23  Time of Surgery: TBD  Where patient plans to recover: At home with family  Fax number for surgical facility: [phone]    Assessment & Plan     The proposed surgical procedure is considered HIGH risk.    Problem List Items Addressed This Visit    None  Visit Diagnoses       Preop general physical exam    -  Primary    Relevant Orders    EKG 12-lead, tracing only (Completed)    Basic Metabolic Panel    CBC w/ Reflex to Iron Studies    INR    Lumbar back pain with radiculopathy affecting lower extremity        Relevant Orders    EKG 12-lead, tracing only (Completed)    Basic Metabolic Panel    CBC w/ Reflex to Iron Studies    INR                    - No identified additional risk factors other than previously addressed    Antiplatelet or Anticoagulation Medication Instructions:   - Patient is on no antiplatelet or anticoagulation medications.    Additional Medication Instructions:  Patient is on no additional chronic  medications    RECOMMENDATION:  APPROVAL GIVEN to proceed with proposed procedure, without further diagnostic evaluation.    Subjective       HPI related to upcoming procedure: Gradual worsening of LBP with radiation into right leg. Now with loss of light sensation in distribution consistent with planned surgery. Failure of PT, multiple injections and other conservative measures. Thus the decision for the planned procedure.        11/22/2023     2:24 PM   Preop Questions   1. Have you ever had a heart attack or stroke? No   2. Have you ever had surgery on your heart or blood vessels, such as a stent placement, a coronary artery bypass, or surgery on an artery in your head, neck, heart, or legs? No   3. Do you have chest pain with activity? No   4. Do you have a history of  heart failure? No   5. Do you currently have a cold, bronchitis or symptoms of other infection? No   6. Do you have a cough, shortness of breath, or wheezing? No   7. Do you or anyone in your family have previous history of blood clots? No   8. Do you or does anyone in your family have a serious bleeding problem such as prolonged bleeding following surgeries or cuts? No   9. Have you ever had problems with anemia or been told to take iron pills? No   10. Have you had any abnormal blood loss such as black, tarry or bloody stools? No   11. Have you ever had a blood transfusion? No   12. Are you willing to have a blood transfusion if it is medically needed before, during, or after your surgery? Yes   13. Have you or any of your relatives ever had problems with anesthesia? No   14. Do you have sleep apnea, excessive snoring or daytime drowsiness? No   15. Do you have any artifical heart valves or other implanted medical devices like a pacemaker, defibrillator, or continuous glucose monitor? No   16. Do you have artificial joints? No   17. Are you allergic to latex? No       Health Care Directive:  Patient does not have a Health Care Directive or  Living Will: Discussed advance care planning with patient; however, patient declined at this time.    Preoperative Review of :   reviewed - no record of controlled substances prescribed.        Review of Systems   All other systems reviewed and are negative.        Patient Active Problem List    Diagnosis Date Noted    Weight loss 11/04/2022     Priority: Medium    Lower urinary tract symptoms (LUTS) 11/04/2022     Priority: Medium    Encounter for Medicare annual wellness exam 07/20/2020     Priority: Medium    Metabolic syndrome X 07/20/2020     Priority: Medium    Dyslipidemia 02/29/2016     Priority: Medium    HTN (hypertension) 02/29/2016     Priority: Medium    ED (erectile dysfunction) of organic origin 08/10/2007     Priority: Medium    Tobacco use disorder 08/10/2007     Priority: Medium     2pk/day for 20 yrs now at 1ppd          Past Medical History:   Diagnosis Date    Dyslipidemia 02/29/2016    ED (erectile dysfunction) of organic origin 08/10/2007    HTN (hypertension) 02/29/2016    Hypertension     Lower urinary tract symptoms (LUTS) 11/04/2022    Metabolic syndrome X 07/20/2020    Prediabetes     Tobacco use disorder 08/10/2007    2pk/day for 20 yrs now at 1ppd       Weight loss 11/04/2022     Past Surgical History:   Procedure Laterality Date    OTHER SURGICAL HISTORY      OTHER SURGICAL HISTORYbroken cheek bone repair     WISDOM TOOTH EXTRACTION       No current outpatient medications on file.       No Known Allergies     Social History     Tobacco Use    Smoking status: Every Day     Packs/day: 1.00     Years: 45.00     Additional pack years: 0.00     Total pack years: 45.00     Types: Cigarettes     Passive exposure: Current    Smokeless tobacco: Never    Tobacco comments:     1 pack per day   Substance Use Topics    Alcohol use: Yes     Alcohol/week: 5.0 standard drinks of alcohol     Types: 5 Cans of beer per week       History   Drug Use Unknown         Objective     /84   Pulse  "75   Temp 98.4  F (36.9  C) (Oral)   Resp 12   Ht 1.689 m (5' 6.5\")   Wt 47.9 kg (105 lb 8 oz)   SpO2 98%   BMI 16.77 kg/m      Physical Exam  Vitals and nursing note reviewed.   Constitutional:       General: He is not in acute distress.     Appearance: Normal appearance.   HENT:      Head: Normocephalic and atraumatic.      Right Ear: Tympanic membrane, ear canal and external ear normal.      Left Ear: Tympanic membrane, ear canal and external ear normal.      Nose: Nose normal.      Mouth/Throat:      Mouth: Mucous membranes are moist.      Pharynx: Oropharynx is clear. No oropharyngeal exudate.   Eyes:      General: No scleral icterus.     Extraocular Movements: Extraocular movements intact.      Conjunctiva/sclera: Conjunctivae normal.   Neck:      Vascular: No carotid bruit.   Cardiovascular:      Rate and Rhythm: Normal rate and regular rhythm.      Pulses: Normal pulses.      Heart sounds: Normal heart sounds. No murmur heard.     No friction rub. No gallop.   Pulmonary:      Effort: Pulmonary effort is normal.      Breath sounds: Normal breath sounds. No wheezing, rhonchi or rales.   Musculoskeletal:         General: No swelling. Normal range of motion.      Cervical back: Normal range of motion.      Right lower leg: No edema.      Left lower leg: No edema.   Skin:     General: Skin is warm and dry.      Capillary Refill: Capillary refill takes less than 2 seconds.      Coloration: Skin is not jaundiced.      Findings: No rash.   Neurological:      General: No focal deficit present.      Mental Status: He is alert and oriented to person, place, and time.      Cranial Nerves: No cranial nerve deficit.      Gait: Gait is intact. Gait normal.      Deep Tendon Reflexes:      Reflex Scores:       Bicep reflexes are 2+ on the right side and 2+ on the left side.       Patellar reflexes are 2+ on the right side and 2+ on the left side.  Psychiatric:         Mood and Affect: Mood normal.         Thought " Content: Thought content normal.           Recent Labs   Lab Test 09/18/23  1059 11/04/22  0817   HGB 14.7 16.5    257    138   POTASSIUM 3.9 4.1   CR 0.79 0.92        Diagnostics:  Recent Results (from the past 168 hour(s))   EKG 12-lead, tracing only    Collection Time: 11/22/23  3:18 PM   Result Value Ref Range    Systolic Blood Pressure 163 mmHg    Diastolic Blood Pressure 93 mmHg    Ventricular Rate 79 BPM    Atrial Rate 340 BPM    AL Interval  ms    QRS Duration 82 ms     ms    QTc 408 ms    P Axis 269 degrees    R AXIS 82 degrees    T Axis 58 degrees    Interpretation ECG       Sinus rhythm  Normal ECG  No previous ECGs available  Confirmed by BIBIANA MORIN MD LOC: ([id number]) on 11/24/2023 8:40:07 AM     INR    Collection Time: 11/22/23  4:01 PM   Result Value Ref Range    INR 1.01 0.85 - 1.15   CBC with Platelets and Reflex to Iron Studies    Collection Time: 11/22/23  4:01 PM   Result Value Ref Range    WBC Count 6.3 4.0 - 11.0 10e3/uL    RBC Count 5.01 4.40 - 5.90 10e6/uL    Hemoglobin 15.4 13.3 - 17.7 g/dL    Hematocrit 46.0 40.0 - 53.0 %    MCV 92 78 - 100 fL    MCH 30.7 26.5 - 33.0 pg    MCHC 33.5 31.5 - 36.5 g/dL    RDW 14.3 10.0 - 15.0 %    Platelet Count 319 150 - 450 10e3/uL   Extra Green Top (Lithium Heparin) Tube    Collection Time: 11/22/23  4:01 PM   Result Value Ref Range    Hold Specimen [facility]    Basic Metabolic Panel    Collection Time: 11/22/23  4:01 PM   Result Value Ref Range    Sodium 140 135 - 145 mmol/L    Potassium 4.3 3.4 - 5.3 mmol/L    Chloride 102 98 - 107 mmol/L    Carbon Dioxide (CO2) 29 22 - 29 mmol/L    Anion Gap 9 7 - 15 mmol/L    Urea Nitrogen 16.6 8.0 - 23.0 mg/dL    Creatinine 0.79 0.67 - 1.17 mg/dL    GFR Estimate >90 >60 mL/min/1.73m2    Calcium 9.8 8.8 - 10.2 mg/dL    Glucose 90 70 - 99 mg/dL        Revised Cardiac Risk Index (RCRI):  The patient has the following serious cardiovascular risks for perioperative complications:   - No serious cardiac risks  = 0 points     RCRI Interpretation: 0 points: Class I (very low risk - 0.4% complication rate)         Signed Electronically by: Vasu Gaviria DO  Copy of this evaluation report is provided to requesting physician.

## 2023-11-22 NOTE — PATIENT INSTRUCTIONS
Preparing for Your Surgery  Getting started  A nurse will call you to review your health history and instructions. They will give you an arrival time based on your scheduled surgery time. Please be ready to share:  Your doctor's clinic name and phone number  Your medical, surgical, and anesthesia history  A list of allergies and sensitivities  A list of medicines, including herbal treatments and over-the-counter drugs  Whether the patient has a legal guardian (ask how to send us the papers in advance)  Please tell us if you're pregnant--or if there's any chance you might be pregnant. Some surgeries may injure a fetus (unborn baby), so they require a pregnancy test. Surgeries that are safe for a fetus don't always need a test, and you can choose whether to have one.   If you have a child who's having surgery, please ask for a copy of Preparing for Your Child's Surgery.    Preparing for surgery  Within 10 to 30 days of surgery: Have a pre-op exam (sometimes called an H&P, or History and Physical). This can be done at a clinic or pre-operative center.  If you're having a , you may not need this exam. Talk to your care team.  At your pre-op exam, talk to your care team about all medicines you take. If you need to stop any medicines before surgery, ask when to start taking them again.  We do this for your safety. Many medicines can make you bleed too much during surgery. Some change how well surgery (anesthesia) drugs work.  Call your insurance company to let them know you're having surgery. (If you don't have insurance, call [phone].)  Call your clinic if there's any change in your health. This includes signs of a cold or flu (sore throat, runny nose, cough, rash, fever). It also includes a scrape or scratch near the surgery site.  If you have questions on the day of surgery, call your hospital or surgery center.  Eating and drinking guidelines  For your safety: Unless your surgeon tells you otherwise,  follow the guidelines below.  Eat and drink as usual until 8 hours before you arrive for surgery. After that, no food or milk.  Drink clear liquids until 2 hours before you arrive. These are liquids you can see through, like water, Gatorade, and Propel Water. They also include plain black coffee and tea (no cream or milk), candy, and breath mints. You can spit out gum when you arrive.  If you drink alcohol: Stop drinking it the night before surgery.  If your care team tells you to take medicine on the morning of surgery, it's okay to take it with a sip of water.  Preventing infection  Shower or bathe the night before and morning of your surgery. Follow the instructions your clinic gave you. (If no instructions, use regular soap.)  Don't shave or clip hair near your surgery site. We'll remove the hair if needed.  Don't smoke or vape the morning of surgery. You may chew nicotine gum up to 2 hours before surgery. A nicotine patch is okay.  Note: Some surgeries require you to completely quit smoking and nicotine. Check with your surgeon.  Your care team will make every effort to keep you safe from infection. We will:  Clean our hands often with soap and water (or an alcohol-based hand rub).  Clean the skin at your surgery site with a special soap that kills germs.  Give you a special gown to keep you warm. (Cold raises the risk of infection.)  Wear special hair covers, masks, gowns and gloves during surgery.  Give antibiotic medicine, if prescribed. Not all surgeries need antibiotics.  What to bring on the day of surgery  Photo ID and insurance card  Copy of your health care directive, if you have one  Glasses and hearing aids (bring cases)  You can't wear contacts during surgery  Inhaler and eye drops, if you use them (tell us about these when you arrive)  CPAP machine or breathing device, if you use them  A few personal items, if spending the night  If you have . . .  A pacemaker, ICD (cardiac defibrillator) or other  implant: Bring the ID card.  An implanted stimulator: Bring the remote control.  A legal guardian: Bring a copy of the certified (court-stamped) guardianship papers.  Please remove any jewelry, including body piercings. Leave jewelry and other valuables at home.  If you're going home the day of surgery  You must have a responsible adult drive you home. They should stay with you overnight as well.  If you don't have someone to stay with you, and you aren't safe to go home alone, we may keep you overnight. Insurance often won't pay for this.  After surgery  If it's hard to control your pain or you need more pain medicine, please call your surgeon's office.  Questions?   If you have any questions for your care team, list them here: _________________________________________________________________________________________________________________________________________________________________________ ____________________________________ ____________________________________ ____________________________________  For informational purposes only. Not to replace the advice of your health care provider. Copyright   2003, 2019 [employer]. All rights reserved. Clinically reviewed by Jovita Simpson MD. SMARTworks 459700 - REV 12/22.    How to Take Your Medication Before Surgery  - STOP taking all vitamins and herbal supplements 14 days before surgery.

## 2023-11-23 LAB
ANION GAP SERPL CALCULATED.3IONS-SCNC: 9 MMOL/L (ref 7–15)
BUN SERPL-MCNC: 16.6 MG/DL (ref 8–23)
CALCIUM SERPL-MCNC: 9.8 MG/DL (ref 8.8–10.2)
CHLORIDE SERPL-SCNC: 102 MMOL/L (ref 98–107)
CREAT SERPL-MCNC: 0.79 MG/DL (ref 0.67–1.17)
DEPRECATED HCO3 PLAS-SCNC: 29 MMOL/L (ref 22–29)
EGFRCR SERPLBLD CKD-EPI 2021: >90 ML/MIN/1.73M2
GLUCOSE SERPL-MCNC: 90 MG/DL (ref 70–99)
POTASSIUM SERPL-SCNC: 4.3 MMOL/L (ref 3.4–5.3)
SODIUM SERPL-SCNC: 140 MMOL/L (ref 135–145)

## 2023-11-24 LAB
ATRIAL RATE - MUSE: 340 BPM
DIASTOLIC BLOOD PRESSURE - MUSE: 93 MMHG
INTERPRETATION ECG - MUSE: NORMAL
P AXIS - MUSE: 269 DEGREES
PR INTERVAL - MUSE: NORMAL MS
QRS DURATION - MUSE: 82 MS
QT - MUSE: 356 MS
QTC - MUSE: 408 MS
R AXIS - MUSE: 82 DEGREES
SYSTOLIC BLOOD PRESSURE - MUSE: 163 MMHG
T AXIS - MUSE: 58 DEGREES
VENTRICULAR RATE- MUSE: 79 BPM

## 2023-11-29 ENCOUNTER — TRANSFERRED RECORDS (OUTPATIENT)
Dept: HEALTH INFORMATION MANAGEMENT | Facility: CLINIC | Age: 73
End: 2023-11-29

## 2023-12-16 ENCOUNTER — HEALTH MAINTENANCE LETTER (OUTPATIENT)
Age: 73
End: 2023-12-16

## 2024-01-15 ENCOUNTER — TRANSFERRED RECORDS (OUTPATIENT)
Dept: HEALTH INFORMATION MANAGEMENT | Facility: CLINIC | Age: 74
End: 2024-01-15
Payer: COMMERCIAL

## 2024-02-06 ENCOUNTER — TELEPHONE (OUTPATIENT)
Dept: FAMILY MEDICINE | Facility: CLINIC | Age: 74
End: 2024-02-06

## 2024-02-06 NOTE — TELEPHONE ENCOUNTER
Patient Quality Outreach    Patient is due for the following:   Colon Cancer Screening  Physical Annual Wellness Visit    Next Steps:   Schedule a Annual Wellness Visit    Type of outreach:    Sent [employer] message.      Questions for provider review:    None           Robe Agustin MA  Chart routed to Care Team.

## 2024-03-15 NOTE — TELEPHONE ENCOUNTER
2nd attempted:     Patient Quality Outreach    Patient is due for the following:   Colon Cancer Screening  Physical Annual Wellness Visit    Next Steps:   Schedule a Annual Wellness Visit    Type of outreach:    Phone, spoke to patient/parent. Pt  Spoke with pt today and he declined scheduling AWV at this time. He thought he already had physical done on 9/18/23 with Dr. Sadler so he will just wait and call back sometime in April or May to schedule. xl    Questions for provider review:    None           Robe Agustin MA

## 2024-06-03 ENCOUNTER — TRANSFERRED RECORDS (OUTPATIENT)
Dept: HEALTH INFORMATION MANAGEMENT | Facility: CLINIC | Age: 74
End: 2024-06-03
Payer: COMMERCIAL

## 2024-11-06 SDOH — HEALTH STABILITY: PHYSICAL HEALTH: ON AVERAGE, HOW MANY MINUTES DO YOU ENGAGE IN EXERCISE AT THIS LEVEL?: 20 MIN

## 2024-11-06 SDOH — HEALTH STABILITY: PHYSICAL HEALTH: ON AVERAGE, HOW MANY DAYS PER WEEK DO YOU ENGAGE IN MODERATE TO STRENUOUS EXERCISE (LIKE A BRISK WALK)?: 2 DAYS

## 2024-11-06 ASSESSMENT — SOCIAL DETERMINANTS OF HEALTH (SDOH): HOW OFTEN DO YOU GET TOGETHER WITH FRIENDS OR RELATIVES?: ONCE A WEEK

## 2024-11-11 ENCOUNTER — OFFICE VISIT (OUTPATIENT)
Dept: FAMILY MEDICINE | Facility: CLINIC | Age: 74
End: 2024-11-11
Payer: COMMERCIAL

## 2024-11-11 VITALS
HEART RATE: 69 BPM | RESPIRATION RATE: 12 BRPM | WEIGHT: 108 LBS | TEMPERATURE: 98.2 F | HEIGHT: 66 IN | BODY MASS INDEX: 17.36 KG/M2 | OXYGEN SATURATION: 97 % | SYSTOLIC BLOOD PRESSURE: 119 MMHG | DIASTOLIC BLOOD PRESSURE: 66 MMHG

## 2024-11-11 DIAGNOSIS — Z12.5 SCREENING FOR PROSTATE CANCER: ICD-10-CM

## 2024-11-11 DIAGNOSIS — R63.4 WEIGHT LOSS: ICD-10-CM

## 2024-11-11 DIAGNOSIS — Z12.11 SPECIAL SCREENING FOR MALIGNANT NEOPLASMS, COLON: ICD-10-CM

## 2024-11-11 DIAGNOSIS — E78.5 DYSLIPIDEMIA: ICD-10-CM

## 2024-11-11 DIAGNOSIS — E88.810 METABOLIC SYNDROME X: ICD-10-CM

## 2024-11-11 DIAGNOSIS — Z00.00 ENCOUNTER FOR MEDICARE ANNUAL WELLNESS EXAM: Primary | ICD-10-CM

## 2024-11-11 DIAGNOSIS — I10 PRIMARY HYPERTENSION: ICD-10-CM

## 2024-11-11 DIAGNOSIS — R39.9 LOWER URINARY TRACT SYMPTOMS (LUTS): ICD-10-CM

## 2024-11-11 LAB
ERYTHROCYTE [DISTWIDTH] IN BLOOD BY AUTOMATED COUNT: 14.6 % (ref 10–15)
HCT VFR BLD AUTO: 44.5 % (ref 40–53)
HGB BLD-MCNC: 14.8 G/DL (ref 13.3–17.7)
MCH RBC QN AUTO: 30.1 PG (ref 26.5–33)
MCHC RBC AUTO-ENTMCNC: 33.3 G/DL (ref 31.5–36.5)
MCV RBC AUTO: 90 FL (ref 78–100)
PLATELET # BLD AUTO: 260 10E3/UL (ref 150–450)
RBC # BLD AUTO: 4.92 10E6/UL (ref 4.4–5.9)
WBC # BLD AUTO: 7.7 10E3/UL (ref 4–11)

## 2024-11-11 PROCEDURE — G0103 PSA SCREENING: HCPCS | Performed by: FAMILY MEDICINE

## 2024-11-11 PROCEDURE — 80053 COMPREHEN METABOLIC PANEL: CPT | Performed by: FAMILY MEDICINE

## 2024-11-11 PROCEDURE — 36415 COLL VENOUS BLD VENIPUNCTURE: CPT | Performed by: FAMILY MEDICINE

## 2024-11-11 PROCEDURE — 90662 IIV NO PRSV INCREASED AG IM: CPT | Performed by: FAMILY MEDICINE

## 2024-11-11 PROCEDURE — 90471 IMMUNIZATION ADMIN: CPT | Performed by: FAMILY MEDICINE

## 2024-11-11 PROCEDURE — 85027 COMPLETE CBC AUTOMATED: CPT | Performed by: FAMILY MEDICINE

## 2024-11-11 PROCEDURE — 90677 PCV20 VACCINE IM: CPT | Performed by: FAMILY MEDICINE

## 2024-11-11 PROCEDURE — 90472 IMMUNIZATION ADMIN EACH ADD: CPT | Performed by: FAMILY MEDICINE

## 2024-11-11 PROCEDURE — 99397 PER PM REEVAL EST PAT 65+ YR: CPT | Mod: 25 | Performed by: FAMILY MEDICINE

## 2024-11-11 ASSESSMENT — ENCOUNTER SYMPTOMS
HEMATURIA: 0
SEIZURES: 0
DYSURIA: 0
PALPITATIONS: 0
EYE PAIN: 0
SHORTNESS OF BREATH: 0
VOMITING: 0
FEVER: 0
COUGH: 0
ARTHRALGIAS: 0
SORE THROAT: 0
COLOR CHANGE: 0
ABDOMINAL PAIN: 0
BACK PAIN: 0
CHILLS: 0

## 2024-11-11 NOTE — PATIENT INSTRUCTIONS
Patient Education   Preventive Care Advice   This is general advice given by our system to help you stay healthy. However, your care team may have specific advice just for you. Please talk to your care team about your preventive care needs.  Nutrition  Eat 5 or more servings of fruits and vegetables each day.  Try wheat bread, brown rice and whole grain pasta (instead of white bread, rice, and pasta).  Get enough calcium and vitamin D. Check the label on foods and aim for 100% of the RDA (recommended daily allowance).  Lifestyle  Exercise at least 150 minutes each week  (30 minutes a day, 5 days a week).  Do muscle strengthening activities 2 days a week. These help control your weight and prevent disease.  No smoking.  Wear sunscreen to prevent skin cancer.  Have a dental exam and cleaning every 6 months.  Yearly exams  See your health care team every year to talk about:  Any changes in your health.  Any medicines your care team has prescribed.  Preventive care, family planning, and ways to prevent chronic diseases.  Shots (vaccines)   HPV shots (up to age 26), if you've never had them before.  Hepatitis B shots (up to age 59), if you've never had them before.  COVID-19 shot: Get this shot when it's due.  Flu shot: Get a flu shot every year.  Tetanus shot: Get a tetanus shot every 10 years.  Pneumococcal, hepatitis A, and RSV shots: Ask your care team if you need these based on your risk.  Shingles shot (for age 50 and up)  General health tests  Diabetes screening:  Starting at age 35, Get screened for diabetes at least every 3 years.  If you are younger than age 35, ask your care team if you should be screened for diabetes.  Cholesterol test: At age 39, start having a cholesterol test every 5 years, or more often if advised.  Bone density scan (DEXA): At age 50, ask your care team if you should have this scan for osteoporosis (brittle bones).  Hepatitis C: Get tested at least once in your life.  STIs (sexually  transmitted infections)  Before age 24: Ask your care team if you should be screened for STIs.  After age 24: Get screened for STIs if you're at risk. You are at risk for STIs (including HIV) if:  You are sexually active with more than one person.  You don't use condoms every time.  You or a partner was diagnosed with a sexually transmitted infection.  If you are at risk for HIV, ask about PrEP medicine to prevent HIV.  Get tested for HIV at least once in your life, whether you are at risk for HIV or not.  Cancer screening tests  Cervical cancer screening: If you have a cervix, begin getting regular cervical cancer screening tests starting at age 21.  Breast cancer scan (mammogram): If you've ever had breasts, begin having regular mammograms starting at age 40. This is a scan to check for breast cancer.  Colon cancer screening: It is important to start screening for colon cancer at age 45.  Have a colonoscopy test every 10 years (or more often if you're at risk) Or, ask your provider about stool tests like a FIT test every year or Cologuard test every 3 years.  To learn more about your testing options, visit:   .  For help making a decision, visit:   https://bit.ly/or98376.  Prostate cancer screening test: If you have a prostate, ask your care team if a prostate cancer screening test (PSA) at age 55 is right for you.  Lung cancer screening: If you are a current or former smoker ages 50 to 80, ask your care team if ongoing lung cancer screenings are right for you.  For informational purposes only. Not to replace the advice of your health care provider. Copyright   2023 [employer]. All rights reserved. Clinically reviewed by the [facility] Transitions Program. eCollect 910060 - REV 01/24.

## 2024-11-11 NOTE — PROGRESS NOTES
"Preventive Care Visit  [facility]  Cornelius Grimaldo MD, Family Medicine  Nov 11, 2024      Assessment & Plan   Problem List Items Addressed This Visit       Dyslipidemia    Relevant Orders    Comprehensive metabolic panel (BMP + Alb, Alk Phos, ALT, AST, Total. Bili, TP)    CBC with platelets    Encounter for Medicare annual wellness exam - Primary     Annual exam.  Weight actually up a couple pounds in comparison to the measurement from last year.  He continues to get pressure from family regarding weight loss but I think it is actually stable.  He lost taste and his nutritional preferences changed quite a bit when he had COVID-19.  This corresponds to his decline in weight.  He is never been a marco antonio who carries much weight with his max lifetime weight being 128 pounds.  He feels strong and has actually added physical capacity in the past year since his back surgery.  We will run CMP, CBC.  Declines lipid tests going forward.  Check PSA as we have been tracking this for a number of years.  He also declines lung cancer screening.  We did discuss that under a decision making framework of \"unexplained weight loss\" we could scan him in the chest, abdomen and pelvis.  We decided not to do this.  He received influenza and pneumonia vaccines prior to departure.  Return to clinic in 1 year.  Consider AAA screening or lung cancer screening but declines both today.         RESOLVED: HTN (hypertension)    Relevant Orders    Comprehensive metabolic panel (BMP + Alb, Alk Phos, ALT, AST, Total. Bili, TP)    CBC with platelets    Lower urinary tract symptoms (LUTS)    Metabolic syndrome X    Weight loss     Other Visit Diagnoses       Screening for prostate cancer        Relevant Orders    PSA, screen    Special screening for malignant neoplasms, colon        Relevant Orders    Colonoscopy Screening  Referral             Patient has been advised of split billing requirements and indicates " "understanding: Yes       Nicotine/Tobacco Cessation  He reports that he has been smoking cigarettes. He has a 75 pack-year smoking history. He has been exposed to tobacco smoke. He has never used smokeless tobacco.  Nicotine/Tobacco Cessation Plan  Information offered: Patient not interested at this time    Counseling  Appropriate preventive services were addressed with this patient via screening, questionnaire, or discussion as appropriate for fall prevention, nutrition, physical activity, Tobacco-use cessation, social engagement, weight loss and cognition.  Checklist reviewing preventive services available has been given to the patient.  Reviewed patient's diet, addressing concerns and/or questions.   He is at risk for lack of exercise and has been provided with information to increase physical activity for the benefit of his well-being.   The patient was instructed to see the dentist every 6 months.       Svetlana Sandoval is a 74 year old, presenting for the following:  Wellness Visit (Non-fasting )        11/11/2024     2:04 PM   Additional Questions   Roomed by tye        Via the Health Maintenance questionnaire, the patient has reported the following services have been completed -Colonscopy: [place] 2022-02-02, this information has been sent to the abstraction team.    Weight:   - he continues to feel good   - wife concerned about weight loss   - \"I can't gain a plan.\"   - eats in the home   - energy has been okay.    - he continues to work.  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    - smell and appetite never returned to normal following COVID-19.   - no respiratory symptoms.  \"I'm not coughing.\"    - he continues to smoke.     - mole left leg - will see Tarbhakti for evaluation.         Health Care Directive  Patient does not have a Health Care Directive: Patient states has Advance Directive and will bring in a copy to clinic.      11/6/2024   General Health   How would you rate your overall physical health? Good "   Feel stress (tense, anxious, or unable to sleep) Not at all            11/6/2024   Nutrition   Diet: Regular (no restrictions)            11/6/2024   Exercise   Days per week of moderate/strenous exercise 2 days   Average minutes spent exercising at this level 20 min      (!) EXERCISE CONCERN      11/6/2024   Social Factors   Frequency of gathering with friends or relatives Once a week   Worry food won't last until get money to buy more No   Food not last or not have enough money for food? No   Do you have housing? (Housing is defined as stable permanent housing and does not include staying ouside in a car, in a tent, in an abandoned building, in an overnight shelter, or couch-surfing.) Yes   Are you worried about losing your housing? No   Lack of transportation? No   Unable to get utilities (heat,electricity)? No            11/6/2024   Fall Risk   Fallen 2 or more times in the past year? No    Trouble with walking or balance? No        Patient-reported          11/6/2024   Activities of Daily Living- Home Safety   Needs help with the following daily activites None of the above   Safety concerns in the home None of the above            11/6/2024   Dental   Dentist two times every year? (!) NO            11/6/2024   Hearing Screening   Hearing concerns? None of the above            11/6/2024   Driving Risk Screening   Patient/family members have concerns about driving No            11/6/2024   General Alertness/Fatigue Screening   Have you been more tired than usual lately? No            11/6/2024   Urinary Incontinence Screening   Bothered by leaking urine in past 6 months No         11/6/2024   TB Screening   Were you born outside of the [place]? No      Today's PHQ-2 Score:       11/11/2024     1:50 PM   PHQ-2 ( 1999 [employer])   Q1: Little interest or pleasure in doing things 0    Q2: Feeling down, depressed or hopeless 0    PHQ-2 Score 0    Q1: Little interest or pleasure in doing things Not at all   Q2: Feeling down,  depressed or hopeless Not at all   PHQ-2 Score 0       Patient-reported           11/6/2024   Substance Use   Alcohol more than 3/day or more than 7/wk No   Do you have a current opioid prescription? No   How severe/bad is pain from 1 to 10? 2/10   Do you use any other substances recreationally? No        Social History     Tobacco Use    Smoking status: Every Day     Current packs/day: 1.00     Average packs/day: 1.2 packs/day for 65.0 years (75.0 ttl pk-yrs)     Types: Cigarettes     Passive exposure: Current    Smokeless tobacco: Never    Tobacco comments:     1 pack per day   Vaping Use    Vaping status: Never Used   Substance Use Topics    Alcohol use: Yes     Alcohol/week: 5.0 standard drinks of alcohol     Types: 5 Cans of beer per week    Drug use: Never     Types: Marijuana       ASCVD Risk   The 10-year ASCVD risk score (Regino DIANA, et al., 2019) is: 20.3%    Values used to calculate the score:      Age: 74 years      Sex: Male      Is Non- : No      Diabetic: No      Tobacco smoker: Yes      Systolic Blood Pressure: 119 mmHg      Is BP treated: No      HDL Cholesterol: 55 mg/dL      Total Cholesterol: 196 mg/dL          Reviewed and updated as needed this visit by Provider                    Patient Active Problem List   Diagnosis    Dyslipidemia    ED (erectile dysfunction) of organic origin    Tobacco use disorder    Encounter for Medicare annual wellness exam    Metabolic syndrome X    Weight loss    Lower urinary tract symptoms (LUTS)     Past Surgical History:   Procedure Laterality Date    OTHER SURGICAL HISTORY      OTHER SURGICAL HISTORYbroken cheek bone repair     WISDOM TOOTH EXTRACTION         Social History     Tobacco Use    Smoking status: Every Day     Current packs/day: 1.00     Average packs/day: 1.2 packs/day for 65.0 years (75.0 ttl pk-yrs)     Types: Cigarettes     Passive exposure: Current    Smokeless tobacco: Never    Tobacco comments:     1 pack per  day   Substance Use Topics    Alcohol use: Yes     Alcohol/week: 5.0 standard drinks of alcohol     Types: 5 Cans of beer per week     Family History   Adopted: Yes   Problem Relation Age of Onset    Cancer Mother         colon ?    No Known Problems Daughter     No Known Problems Daughter     Cancer Maternal Half-Brother          Current providers sharing in care for this patient include:  Patient Care Team:  Cornelius Grimaldo MD as PCP - General (Family Practice)  Cornelius Grimaldo MD as Assigned PCP    The following health maintenance items are reviewed in Epic and correct as of today:  Health Maintenance   Topic Date Due    Pneumococcal Vaccine: 65+ Years (1 of 2 - PCV) Never done    ZOSTER IMMUNIZATION (1 of 2) Never done    LUNG CANCER SCREENING  Never done    AORTIC ANEURYSM SCREENING (SYSTEM ASSIGNED)  Never done    LIPID  11/04/2023    COLORECTAL CANCER SCREENING  12/12/2023    INFLUENZA VACCINE (1) 09/01/2024    COVID-19 Vaccine (5 - 2024-25 season) 09/01/2024    BMP  11/22/2024    NICOTINE/TOBACCO CESSATION COUNSELING Q 1 YR  11/11/2025    MEDICARE ANNUAL WELLNESS VISIT  11/11/2025    ANNUAL REVIEW OF HM ORDERS  11/11/2025    FALL RISK ASSESSMENT  11/11/2025    DTAP/TDAP/TD IMMUNIZATION (3 - Td or Tdap) 07/16/2026    GLUCOSE  11/22/2026    RSV VACCINE (1 - 1-dose 75+ series) 09/16/2027    ADVANCE CARE PLANNING  11/11/2029    HEPATITIS C SCREENING  Completed    PHQ-2 (once per calendar year)  Completed    HPV IMMUNIZATION  Aged Out    MENINGITIS IMMUNIZATION  Aged Out    RSV MONOCLONAL ANTIBODY  Aged Out     Review of Systems   Constitutional:  Negative for chills and fever.   HENT:  Negative for ear pain and sore throat.    Eyes:  Negative for pain and visual disturbance.   Respiratory:  Negative for cough and shortness of breath.    Cardiovascular:  Negative for chest pain and palpitations.   Gastrointestinal:  Negative for abdominal pain and vomiting.   Genitourinary:  Negative for dysuria and  "hematuria.   Musculoskeletal:  Negative for arthralgias and back pain.   Skin:  Negative for color change and rash.   Neurological:  Negative for seizures and syncope.   All other systems reviewed and are negative.         Objective    Exam  /66 (BP Location: Left arm, Patient Position: Sitting, Cuff Size: Adult Regular)   Pulse 69   Temp 98.2  F (36.8  C) (Oral)   Resp 12   Ht 1.683 m (5' 6.25\")   Wt 49 kg (108 lb)   SpO2 97%   BMI 17.30 kg/m     Estimated body mass index is 17.3 kg/m  as calculated from the following:    Height as of this encounter: 1.683 m (5' 6.25\").    Weight as of this encounter: 49 kg (108 lb).    Physical Exam  Vitals reviewed.   Constitutional:       General: He is not in acute distress.     Appearance: Normal appearance. He is not ill-appearing.   HENT:      Head: Normocephalic and atraumatic.      Right Ear: External ear normal.      Left Ear: External ear normal.      Nose: Nose normal.      Mouth/Throat:      Pharynx: Oropharynx is clear. No oropharyngeal exudate or posterior oropharyngeal erythema.   Eyes:      General: No scleral icterus.        Right eye: No discharge.         Left eye: No discharge.      Extraocular Movements: Extraocular movements intact.      Conjunctiva/sclera: Conjunctivae normal.      Pupils: Pupils are equal, round, and reactive to light.   Neck:      Comments: No thyromegaly.  Cardiovascular:      Rate and Rhythm: Normal rate and regular rhythm.      Heart sounds: Normal heart sounds. No murmur heard.     No friction rub. No gallop.   Pulmonary:      Effort: Pulmonary effort is normal. No respiratory distress.      Breath sounds: Normal breath sounds. No wheezing or rales.   Abdominal:      General: There is no distension.      Palpations: Abdomen is soft. There is no mass.      Tenderness: There is no abdominal tenderness.   Musculoskeletal:         General: No signs of injury. Normal range of motion.      Cervical back: Normal range of motion. "      Right lower leg: No edema.      Left lower leg: No edema.   Lymphadenopathy:      Cervical: No cervical adenopathy.   Skin:     General: Skin is warm.      Coloration: Skin is not jaundiced.      Findings: No rash.   Neurological:      General: No focal deficit present.      Mental Status: He is alert and oriented to person, place, and time.      Cranial Nerves: No cranial nerve deficit.      Deep Tendon Reflexes: Reflexes normal.   Psychiatric:         Mood and Affect: Mood normal.           11/11/2024   Mini Cog   Clock Draw Score 2 Normal   3 Item Recall 3 objects recalled   Mini Cog Total Score 5                Signed Electronically by: Cornelius Grimaldo MD

## 2024-11-11 NOTE — ASSESSMENT & PLAN NOTE
"Annual exam.  Weight actually up a couple pounds in comparison to the measurement from last year.  He continues to get pressure from family regarding weight loss but I think it is actually stable.  He lost taste and his nutritional preferences changed quite a bit when he had COVID-19.  This corresponds to his decline in weight.  He is never been a marco antonio who carries much weight with his max lifetime weight being 128 pounds.  He feels strong and has actually added physical capacity in the past year since his back surgery.  We will run CMP, CBC.  Declines lipid tests going forward.  Check PSA as we have been tracking this for a number of years.  He also declines lung cancer screening.  We did discuss that under a decision making framework of \"unexplained weight loss\" we could scan him in the chest, abdomen and pelvis.  We decided not to do this.  He received influenza and pneumonia vaccines prior to departure.  Return to clinic in 1 year.  Consider AAA screening or lung cancer screening but declines both today.  "

## 2024-11-12 LAB
ALBUMIN SERPL BCG-MCNC: 4.4 G/DL (ref 3.5–5.2)
ALP SERPL-CCNC: 98 U/L (ref 40–150)
ALT SERPL W P-5'-P-CCNC: 14 U/L (ref 0–70)
ANION GAP SERPL CALCULATED.3IONS-SCNC: 12 MMOL/L (ref 7–15)
AST SERPL W P-5'-P-CCNC: 23 U/L (ref 0–45)
BILIRUB SERPL-MCNC: 0.3 MG/DL
BUN SERPL-MCNC: 26.7 MG/DL (ref 8–23)
CALCIUM SERPL-MCNC: 9.8 MG/DL (ref 8.8–10.4)
CHLORIDE SERPL-SCNC: 98 MMOL/L (ref 98–107)
CREAT SERPL-MCNC: 0.85 MG/DL (ref 0.67–1.17)
EGFRCR SERPLBLD CKD-EPI 2021: >90 ML/MIN/1.73M2
GLUCOSE SERPL-MCNC: 83 MG/DL (ref 70–99)
HCO3 SERPL-SCNC: 27 MMOL/L (ref 22–29)
POTASSIUM SERPL-SCNC: 4.1 MMOL/L (ref 3.4–5.3)
PROT SERPL-MCNC: 7.4 G/DL (ref 6.4–8.3)
PSA SERPL DL<=0.01 NG/ML-MCNC: 2.04 NG/ML (ref 0–6.5)
SODIUM SERPL-SCNC: 137 MMOL/L (ref 135–145)